# Patient Record
Sex: FEMALE | URBAN - METROPOLITAN AREA
[De-identification: names, ages, dates, MRNs, and addresses within clinical notes are randomized per-mention and may not be internally consistent; named-entity substitution may affect disease eponyms.]

---

## 2023-04-26 ENCOUNTER — APPOINTMENT (RX ONLY)
Dept: URBAN - METROPOLITAN AREA CLINIC 41 | Facility: CLINIC | Age: 78
Setting detail: DERMATOLOGY
End: 2023-04-26

## 2023-04-26 DIAGNOSIS — B07.8 OTHER VIRAL WARTS: ICD-10-CM

## 2023-04-26 DIAGNOSIS — L57.0 ACTINIC KERATOSIS: ICD-10-CM

## 2023-04-26 DIAGNOSIS — D22 MELANOCYTIC NEVI: ICD-10-CM

## 2023-04-26 DIAGNOSIS — Z86.006 PERSONAL HISTORY OF MELANOMA IN-SITU: ICD-10-CM

## 2023-04-26 DIAGNOSIS — Z71.89 OTHER SPECIFIED COUNSELING: ICD-10-CM

## 2023-04-26 DIAGNOSIS — L82.1 OTHER SEBORRHEIC KERATOSIS: ICD-10-CM

## 2023-04-26 DIAGNOSIS — D36.1 BENIGN NEOPLASM OF PERIPHERAL NERVES AND AUTONOMIC NERVOUS SYSTEM: ICD-10-CM

## 2023-04-26 PROBLEM — D36.14 BENIGN NEOPLASM OF PERIPHERAL NERVES AND AUTONOMIC NERVOUS SYSTEM OF THORAX: Status: ACTIVE | Noted: 2023-04-26

## 2023-04-26 PROBLEM — D22.71 MELANOCYTIC NEVI OF RIGHT LOWER LIMB, INCLUDING HIP: Status: ACTIVE | Noted: 2023-04-26

## 2023-04-26 PROCEDURE — ? LIQUID NITROGEN

## 2023-04-26 PROCEDURE — ? PRESCRIPTION

## 2023-04-26 PROCEDURE — 17000 DESTRUCT PREMALG LESION: CPT

## 2023-04-26 PROCEDURE — ? COUNSELING

## 2023-04-26 PROCEDURE — 99203 OFFICE O/P NEW LOW 30 MIN: CPT | Mod: 25

## 2023-04-26 RX ORDER — FLUOROURACIL 5 MG/G
1 CREAM TOPICAL BID
Qty: 40 | Refills: 1 | Status: ERX | COMMUNITY
Start: 2023-04-26

## 2023-04-26 RX ADMIN — FLUOROURACIL 1: 5 CREAM TOPICAL at 00:00

## 2023-04-26 ASSESSMENT — LOCATION SIMPLE DESCRIPTION DERM
LOCATION SIMPLE: RIGHT BREAST
LOCATION SIMPLE: NOSE
LOCATION SIMPLE: LEFT HAND
LOCATION SIMPLE: RIGHT HAND
LOCATION SIMPLE: RIGHT 2ND TOE

## 2023-04-26 ASSESSMENT — LOCATION ZONE DERM
LOCATION ZONE: HAND
LOCATION ZONE: TRUNK
LOCATION ZONE: NOSE
LOCATION ZONE: TOE

## 2023-04-26 ASSESSMENT — LOCATION DETAILED DESCRIPTION DERM
LOCATION DETAILED: RIGHT MEDIAL BREAST 1-2:00 REGION
LOCATION DETAILED: RIGHT DORSAL 2ND TOE
LOCATION DETAILED: NASAL DORSUM
LOCATION DETAILED: 2ND WEB SPACE LEFT HAND
LOCATION DETAILED: RIGHT RADIAL DORSAL HAND

## 2023-04-26 NOTE — PROCEDURE: LIQUID NITROGEN
Post-Care Instructions: I reviewed with the patient in detail post-care instructions. Patient is to wear sunprotection, and avoid picking at any of the treated lesions. Pt may apply Vaseline to crusted or scabbing areas.
Number Of Freeze-Thaw Cycles: 2 freeze-thaw cycles
Show Aperture Variable?: Yes
Consent: The patient's consent was obtained including but not limited to risks of crusting, scabbing, blistering, scarring, darker or lighter pigmentary change, recurrence, incomplete removal and infection.
Render Post-Care Instructions In Note?: no
Detail Level: Simple

## 2023-04-26 NOTE — PROCEDURE: COUNSELING
When Should The Patient Follow-Up For Their Next Full-Body Skin Exam?: 1 Year
Quality 137: Melanoma: Continuity Of Care - Recall System: Patient information entered into a recall system that includes: target date for the next exam specified AND a process to follow up with patients regarding missed or unscheduled appointments
Detail Level: Zone
Detail Level: Generalized
Skin Checks Recommendations: Photo protective clothing, broad spectrum zinc oxide 50 SPF
Sunscreen Recommendations: Broad spectrum zinc oxide 30-50 SPF applied through the day
Detail Level: Simple
Sunscreen Recommendations: Discussed continual use of photo protection with sun screen and photo protective clothing

## 2023-05-26 ENCOUNTER — APPOINTMENT (RX ONLY)
Dept: URBAN - METROPOLITAN AREA CLINIC 41 | Facility: CLINIC | Age: 78
Setting detail: DERMATOLOGY
End: 2023-05-26

## 2023-05-26 DIAGNOSIS — L24.4 IRRITANT CONTACT DERMATITIS DUE TO DRUGS IN CONTACT WITH SKIN: ICD-10-CM

## 2023-05-26 PROCEDURE — 99213 OFFICE O/P EST LOW 20 MIN: CPT

## 2023-05-26 PROCEDURE — ? COUNSELING

## 2023-05-26 PROCEDURE — ? PRESCRIPTION

## 2023-05-26 RX ORDER — FLUTICASONE PROPIONATE 0.5 MG/G
1 CREAM TOPICAL BID
Qty: 30 | Refills: 2 | Status: ERX | COMMUNITY
Start: 2023-05-26

## 2023-05-26 RX ADMIN — FLUTICASONE PROPIONATE 1: 0.5 CREAM TOPICAL at 00:00

## 2023-05-26 ASSESSMENT — LOCATION SIMPLE DESCRIPTION DERM: LOCATION SIMPLE: RIGHT LIP

## 2023-05-26 ASSESSMENT — LOCATION ZONE DERM: LOCATION ZONE: LIP

## 2023-05-26 ASSESSMENT — LOCATION DETAILED DESCRIPTION DERM: LOCATION DETAILED: RIGHT UPPER CUTANEOUS LIP
